# Patient Record
Sex: FEMALE | Race: WHITE | NOT HISPANIC OR LATINO | URBAN - NONMETROPOLITAN AREA
[De-identification: names, ages, dates, MRNs, and addresses within clinical notes are randomized per-mention and may not be internally consistent; named-entity substitution may affect disease eponyms.]

---

## 2017-10-10 ENCOUNTER — APPOINTMENT (OUTPATIENT)
Dept: URBAN - NONMETROPOLITAN AREA CLINIC 39 | Age: 52
Setting detail: DERMATOLOGY
End: 2017-10-10

## 2017-10-10 DIAGNOSIS — D485 NEOPLASM OF UNCERTAIN BEHAVIOR OF SKIN: ICD-10-CM

## 2017-10-10 PROBLEM — D48.5 NEOPLASM OF UNCERTAIN BEHAVIOR OF SKIN: Status: ACTIVE | Noted: 2017-10-10

## 2017-10-10 PROBLEM — L55.1 SUNBURN OF SECOND DEGREE: Status: ACTIVE | Noted: 2017-10-10

## 2017-10-10 PROCEDURE — OTHER REASSURANCE: OTHER

## 2017-10-10 PROCEDURE — OTHER COUNSELING: OTHER

## 2017-10-10 PROCEDURE — OTHER OTHER: OTHER

## 2017-10-10 PROCEDURE — 99202 OFFICE O/P NEW SF 15 MIN: CPT

## 2017-10-10 PROCEDURE — OTHER MIPS QUALITY: OTHER

## 2017-10-10 NOTE — PROCEDURE: OTHER
Note Text (......Xxx Chief Complaint.): This diagnosis correlates with the
Other (Free Text): Refer to hand ortho for bx
Detail Level: Detailed